# Patient Record
Sex: MALE | Race: WHITE | URBAN - NONMETROPOLITAN AREA
[De-identification: names, ages, dates, MRNs, and addresses within clinical notes are randomized per-mention and may not be internally consistent; named-entity substitution may affect disease eponyms.]

---

## 2017-03-28 ENCOUNTER — TRANSFERRED RECORDS (OUTPATIENT)
Dept: HEALTH INFORMATION MANAGEMENT | Facility: HOSPITAL | Age: 67
End: 2017-03-28

## 2017-10-11 RX ORDER — MULTIVIT-MIN/IRON/FOLIC/HRB186 3.3 MG-25
1 TABLET ORAL DAILY
COMMUNITY
End: 2020-07-21

## 2017-10-11 RX ORDER — CHOLECALCIFEROL (VITAMIN D3) 25 MCG
1 CAPSULE ORAL DAILY
COMMUNITY
End: 2020-07-21

## 2017-10-11 RX ORDER — MESALAMINE 400 MG/1
400 CAPSULE, DELAYED RELEASE ORAL 3 TIMES DAILY
COMMUNITY
End: 2017-10-11

## 2017-10-11 RX ORDER — MULTIPLE VITAMINS W/ MINERALS TAB 9MG-400MCG
1 TAB ORAL DAILY
COMMUNITY
End: 2017-10-11

## 2017-10-17 ENCOUNTER — SURGERY (OUTPATIENT)
Age: 67
End: 2017-10-17

## 2017-10-17 ENCOUNTER — ANESTHESIA EVENT (OUTPATIENT)
Dept: SURGERY | Facility: HOSPITAL | Age: 67
End: 2017-10-17
Payer: MEDICARE

## 2017-10-17 ENCOUNTER — ANESTHESIA (OUTPATIENT)
Dept: SURGERY | Facility: HOSPITAL | Age: 67
End: 2017-10-17
Payer: MEDICARE

## 2017-10-17 ENCOUNTER — HOSPITAL ENCOUNTER (OUTPATIENT)
Facility: HOSPITAL | Age: 67
Discharge: HOME | End: 2017-10-17
Attending: INTERNAL MEDICINE | Admitting: INTERNAL MEDICINE
Payer: MEDICARE

## 2017-10-17 VITALS
TEMPERATURE: 97 F | SYSTOLIC BLOOD PRESSURE: 125 MMHG | OXYGEN SATURATION: 93 % | RESPIRATION RATE: 20 BRPM | DIASTOLIC BLOOD PRESSURE: 96 MMHG

## 2017-10-17 PROCEDURE — 45385 COLONOSCOPY W/LESION REMOVAL: CPT | Mod: PT | Performed by: ANESTHESIOLOGY

## 2017-10-17 PROCEDURE — 27210794 ZZH OR GENERAL SUPPLY STERILE: Performed by: INTERNAL MEDICINE

## 2017-10-17 PROCEDURE — 40000306 ZZH STATISTIC PRE PROC ASSESS II: Performed by: INTERNAL MEDICINE

## 2017-10-17 PROCEDURE — 36000050 ZZH SURGERY LEVEL 2 1ST 30 MIN: Performed by: INTERNAL MEDICINE

## 2017-10-17 PROCEDURE — 71000027 ZZH RECOVERY PHASE 2 EACH 15 MINS: Performed by: INTERNAL MEDICINE

## 2017-10-17 PROCEDURE — 45385 COLONOSCOPY W/LESION REMOVAL: CPT | Mod: PT | Performed by: NURSE ANESTHETIST, CERTIFIED REGISTERED

## 2017-10-17 PROCEDURE — 25000128 H RX IP 250 OP 636: Performed by: NURSE ANESTHETIST, CERTIFIED REGISTERED

## 2017-10-17 PROCEDURE — 88305 TISSUE EXAM BY PATHOLOGIST: CPT | Mod: TC | Performed by: INTERNAL MEDICINE

## 2017-10-17 PROCEDURE — 37000008 ZZH ANESTHESIA TECHNICAL FEE, 1ST 30 MIN: Performed by: INTERNAL MEDICINE

## 2017-10-17 PROCEDURE — 25000128 H RX IP 250 OP 636: Performed by: ANESTHESIOLOGY

## 2017-10-17 RX ORDER — ONDANSETRON 4 MG/1
4 TABLET, ORALLY DISINTEGRATING ORAL EVERY 30 MIN PRN
Status: DISCONTINUED | OUTPATIENT
Start: 2017-10-17 | End: 2017-10-17 | Stop reason: HOSPADM

## 2017-10-17 RX ORDER — SODIUM CHLORIDE, SODIUM LACTATE, POTASSIUM CHLORIDE, CALCIUM CHLORIDE 600; 310; 30; 20 MG/100ML; MG/100ML; MG/100ML; MG/100ML
INJECTION, SOLUTION INTRAVENOUS CONTINUOUS
Status: DISCONTINUED | OUTPATIENT
Start: 2017-10-17 | End: 2017-10-17 | Stop reason: HOSPADM

## 2017-10-17 RX ORDER — ONDANSETRON 2 MG/ML
4 INJECTION INTRAMUSCULAR; INTRAVENOUS
Status: DISCONTINUED | OUTPATIENT
Start: 2017-10-17 | End: 2017-10-17 | Stop reason: HOSPADM

## 2017-10-17 RX ORDER — MEPERIDINE HYDROCHLORIDE 25 MG/ML
12.5 INJECTION INTRAMUSCULAR; INTRAVENOUS; SUBCUTANEOUS
Status: DISCONTINUED | OUTPATIENT
Start: 2017-10-17 | End: 2017-10-17 | Stop reason: HOSPADM

## 2017-10-17 RX ORDER — HYDRALAZINE HYDROCHLORIDE 20 MG/ML
2.5-5 INJECTION INTRAMUSCULAR; INTRAVENOUS EVERY 10 MIN PRN
Status: DISCONTINUED | OUTPATIENT
Start: 2017-10-17 | End: 2017-10-17 | Stop reason: HOSPADM

## 2017-10-17 RX ORDER — ONDANSETRON 2 MG/ML
4 INJECTION INTRAMUSCULAR; INTRAVENOUS EVERY 30 MIN PRN
Status: DISCONTINUED | OUTPATIENT
Start: 2017-10-17 | End: 2017-10-17 | Stop reason: HOSPADM

## 2017-10-17 RX ORDER — DEXAMETHASONE SODIUM PHOSPHATE 4 MG/ML
4 INJECTION, SOLUTION INTRA-ARTICULAR; INTRALESIONAL; INTRAMUSCULAR; INTRAVENOUS; SOFT TISSUE EVERY 10 MIN PRN
Status: DISCONTINUED | OUTPATIENT
Start: 2017-10-17 | End: 2017-10-17 | Stop reason: HOSPADM

## 2017-10-17 RX ORDER — NALOXONE HYDROCHLORIDE 0.4 MG/ML
.1-.4 INJECTION, SOLUTION INTRAMUSCULAR; INTRAVENOUS; SUBCUTANEOUS
Status: DISCONTINUED | OUTPATIENT
Start: 2017-10-17 | End: 2017-10-17 | Stop reason: HOSPADM

## 2017-10-17 RX ORDER — FENTANYL CITRATE 50 UG/ML
25-50 INJECTION, SOLUTION INTRAMUSCULAR; INTRAVENOUS
Status: DISCONTINUED | OUTPATIENT
Start: 2017-10-17 | End: 2017-10-17 | Stop reason: HOSPADM

## 2017-10-17 RX ORDER — FENTANYL CITRATE 50 UG/ML
INJECTION, SOLUTION INTRAMUSCULAR; INTRAVENOUS PRN
Status: DISCONTINUED | OUTPATIENT
Start: 2017-10-17 | End: 2017-10-17

## 2017-10-17 RX ORDER — PROPOFOL 10 MG/ML
INJECTION, EMULSION INTRAVENOUS PRN
Status: DISCONTINUED | OUTPATIENT
Start: 2017-10-17 | End: 2017-10-17

## 2017-10-17 RX ORDER — ALBUTEROL SULFATE 0.83 MG/ML
2.5 SOLUTION RESPIRATORY (INHALATION) EVERY 4 HOURS PRN
Status: DISCONTINUED | OUTPATIENT
Start: 2017-10-17 | End: 2017-10-17 | Stop reason: HOSPADM

## 2017-10-17 RX ORDER — PROMETHAZINE HYDROCHLORIDE 25 MG/ML
12.5 INJECTION, SOLUTION INTRAMUSCULAR; INTRAVENOUS
Status: DISCONTINUED | OUTPATIENT
Start: 2017-10-17 | End: 2017-10-17 | Stop reason: HOSPADM

## 2017-10-17 RX ORDER — LABETALOL HYDROCHLORIDE 5 MG/ML
10 INJECTION, SOLUTION INTRAVENOUS
Status: DISCONTINUED | OUTPATIENT
Start: 2017-10-17 | End: 2017-10-17 | Stop reason: HOSPADM

## 2017-10-17 RX ORDER — LIDOCAINE 40 MG/G
CREAM TOPICAL
Status: DISCONTINUED | OUTPATIENT
Start: 2017-10-17 | End: 2017-10-17 | Stop reason: HOSPADM

## 2017-10-17 RX ADMIN — PROPOFOL 30 MG: 10 INJECTION, EMULSION INTRAVENOUS at 10:30

## 2017-10-17 RX ADMIN — PROPOFOL 10 MG: 10 INJECTION, EMULSION INTRAVENOUS at 10:38

## 2017-10-17 RX ADMIN — FENTANYL CITRATE 50 MCG: 50 INJECTION, SOLUTION INTRAMUSCULAR; INTRAVENOUS at 10:22

## 2017-10-17 RX ADMIN — PROPOFOL 50 MG: 10 INJECTION, EMULSION INTRAVENOUS at 10:28

## 2017-10-17 RX ADMIN — PROPOFOL 10 MG: 10 INJECTION, EMULSION INTRAVENOUS at 10:40

## 2017-10-17 RX ADMIN — SODIUM CHLORIDE, POTASSIUM CHLORIDE, SODIUM LACTATE AND CALCIUM CHLORIDE: 600; 310; 30; 20 INJECTION, SOLUTION INTRAVENOUS at 09:36

## 2017-10-17 RX ADMIN — FENTANYL CITRATE 50 MCG: 50 INJECTION, SOLUTION INTRAMUSCULAR; INTRAVENOUS at 10:27

## 2017-10-17 RX ADMIN — PROPOFOL 20 MG: 10 INJECTION, EMULSION INTRAVENOUS at 10:32

## 2017-10-17 RX ADMIN — PROPOFOL 20 MG: 10 INJECTION, EMULSION INTRAVENOUS at 10:36

## 2017-10-17 RX ADMIN — MIDAZOLAM HYDROCHLORIDE 1 MG: 1 INJECTION, SOLUTION INTRAMUSCULAR; INTRAVENOUS at 10:22

## 2017-10-17 RX ADMIN — MIDAZOLAM HYDROCHLORIDE 1 MG: 1 INJECTION, SOLUTION INTRAMUSCULAR; INTRAVENOUS at 10:27

## 2017-10-17 RX ADMIN — PROPOFOL 20 MG: 10 INJECTION, EMULSION INTRAVENOUS at 10:34

## 2017-10-17 ASSESSMENT — LIFESTYLE VARIABLES: TOBACCO_USE: 1

## 2017-10-17 NOTE — OR NURSING
Patient and responsible adult given discharge instructions with no questions regarding instructions. Lele score 20. Pain level 0/10.  Discharged from unit via ambulatory. Patient discharged to home.

## 2017-10-17 NOTE — IP AVS SNAPSHOT
MRN:[id number]                      After Visit Summary   10/17/2017    Nicholas Gomez    MRN: [id number]           Thank you!     Thank you for choosing [facility] for your care. Our goal is always to provide you with excellent care. Hearing back from our patients is one way we can continue to improve our services. Please take a few minutes to complete the written survey that you may receive in the mail after you visit with us. Thank you!        Patient Information     Date Of Birth          1950        About your hospital stay     You were admitted on:  October 17, 2017 You last received care in the:  [facility] Preop/Phase II    You were discharged on:  October 17, 2017       Who to Call     For medical emergencies, please call 911.  For non-urgent questions about your medical care, please call your primary care provider or clinic, [phone]  For questions related to your surgery, please call your surgery clinic        Attending Provider     Provider Specialty    Benji Simpson MD Gastroenterology       Primary Care Provider Office Phone # Fax #    Marshal Vargas [phone]      Further instructions from your care team           Post-Anesthesia Patient Instructions    IMMEDIATELY FOLLOWING SURGERY:  Do not drive or operate machinery for the first twenty four hours after surgery.  Do not make any important decisions for twenty four hours after surgery or while taking narcotic pain medications or sedatives.  If you develop intractable nausea and vomiting or a severe headache please notify your doctor immediately.    FOLLOW-UP:  Please make an appointment with your surgeon as instructed. You do not need to follow up with anesthesia unless specifically instructed to do so.    WOUND CARE INSTRUCTIONS (if applicable):  Keep a dry clean dressing on the anesthesia/puncture wound site if there is drainage.  Once the wound has quit draining you may leave it open to air.  Generally  you should leave the bandage intact for twenty four hours unless there is drainage.  If the epidural site drains for more than 36-48 hours please call the anesthesia department.    QUESTIONS?:  Please feel free to call your physician or the hospital  if you have any questions, and they will be happy to assist you.             INSTRUCTIONS AFTER COLONOSCOPY    WHEN YOU ARE BACK HOME:    Plan to rest for an hour or two after you get home.    You may have some cramping or pressure until you pass gas.    You may resume your regular medications.    Eat a small, light meal at first, and then gradually return to normal meal sizes.  If you had a polyp removed:    Slight bleeding may occur.  You may have a slight blood stain on the toilet paper after a bowel movement.    To lessen the chance of bleeding, avoid heavy exercise for ONE WEEK.  This includes heavy lifting, vigorous sport activities, and heavy physical labor.  You may resume your normal sexual activity.      Avoid aspirin or aspirin products if instructed by your doctor.    WHAT TO WATCH FOR:  Problems rarely occur after the exam; however, it is important for you to watch for early signs of possible problems.  If you have     Unusual pain in your abdomen    Nausea and vomiting that persists    Excessive bleeding    Black or bloody bowel movements    Fever or temperature above 100.6 F  Please call your doctor ([facility] [phone]) or go to the nearest hospital emergency room.    Pending Results     No orders found from 10/15/2017 to 10/18/2017.            Admission Information     Date & Time Provider Department Dept. Phone    10/17/2017 Benji Simpson MD HI Preop/Phase [phone]      Your Vitals Were     Blood Pressure Temperature Respirations Pulse Oximetry          125/96 97.8  F (36.6  C) (Oral) 20 96%        MyChart Information     MyChart lets you send messages to your doctor, view your test results, renew your prescriptions, schedule  "appointments and more. To sign up, go to [email or web address] . Click on \"Log in\" on the left side of the screen, which will take you to the Welcome page. Then click on \"Sign up Now\" on the right side of the page.     You will be asked to enter the access code listed below, as well as some personal information. Please follow the directions to create your username and password.     Your access code is: [email or web address]  Expires: 1/15/2018 11:35 AM     Your access code will  in 90 days. If you need help or a new code, please call your [facility] clinic or [phone].        Care EveryWhere ID     This is your Care EveryWhere ID. This could be used by other organizations to access your [facility] medical records  [id number]        Equal Access to Services     POORNIMA LANG : Gerri Lester, rita juarez, yonny zaragoza, leah bustillo . So [facility] [phone].    ATENCIÓN: Si habla español, tiene a warner disposición servicios gratuitos de asistencia lingüística. Llame al [phone].    We comply with applicable federal civil rights laws and [place] laws. We do not discriminate on the basis of race, color, national origin, age, disability, sex, sexual orientation, or gender identity.               Review of your medicines      CONTINUE these medicines which have NOT CHANGED        Dose / Directions    ALLOPURINOL PO        Dose:  100 mg   Take 100 mg by mouth daily   Refills:  0       ASPIRIN PO        Dose:  81 mg   Take 81 mg by mouth daily   Refills:  0       Co Q 10 100 MG Caps        Dose:  1 capsule   Take 1 capsule by mouth daily   Refills:  0       Glucosamine-Chondroitin 250-200 MG Caps        Dose:  1 capsule   Take 1 capsule by mouth daily   Refills:  0       GNP FLAX SEED OIL 1000 MG Caps        Dose:  1 capsule   Take 1 capsule by mouth daily   Refills:  0       INDOMETHACIN PO        Dose:  25 mg   Take 25 mg by mouth 3 times daily (with meals) "   Refills:  0       LISINOPRIL PO        Dose:  20 mg   Take 20 mg by mouth daily   Refills:  0       Vitamin D-3 1000 UNITS Caps        Dose:  1 capsule   Take 1 capsule by mouth daily   Refills:  0                Protect others around you: Learn how to safely use, store and throw away your medicines at www.disposemymeds.org.             Medication List: This is a list of all your medications and when to take them. Check marks below indicate your daily home schedule. Keep this list as a reference.      Medications           Morning Afternoon Evening Bedtime As Needed    ALLOPURINOL PO   Take 100 mg by mouth daily                                ASPIRIN PO   Take 81 mg by mouth daily                                Co Q 10 100 MG Caps   Take 1 capsule by mouth daily                                Glucosamine-Chondroitin 250-200 MG Caps   Take 1 capsule by mouth daily                                GNP FLAX SEED OIL 1000 MG Caps   Take 1 capsule by mouth daily                                INDOMETHACIN PO   Take 25 mg by mouth 3 times daily (with meals)                                LISINOPRIL PO   Take 20 mg by mouth daily                                Vitamin D-3 1000 UNITS Caps   Take 1 capsule by mouth daily

## 2017-10-17 NOTE — DISCHARGE INSTRUCTIONS
Post-Anesthesia Patient Instructions    IMMEDIATELY FOLLOWING SURGERY:  Do not drive or operate machinery for the first twenty four hours after surgery.  Do not make any important decisions for twenty four hours after surgery or while taking narcotic pain medications or sedatives.  If you develop intractable nausea and vomiting or a severe headache please notify your doctor immediately.    FOLLOW-UP:  Please make an appointment with your surgeon as instructed. You do not need to follow up with anesthesia unless specifically instructed to do so.    WOUND CARE INSTRUCTIONS (if applicable):  Keep a dry clean dressing on the anesthesia/puncture wound site if there is drainage.  Once the wound has quit draining you may leave it open to air.  Generally you should leave the bandage intact for twenty four hours unless there is drainage.  If the epidural site drains for more than 36-48 hours please call the anesthesia department.    QUESTIONS?:  Please feel free to call your physician or the hospital  if you have any questions, and they will be happy to assist you.             INSTRUCTIONS AFTER COLONOSCOPY    WHEN YOU ARE BACK HOME:    Plan to rest for an hour or two after you get home.    You may have some cramping or pressure until you pass gas.    You may resume your regular medications.    Eat a small, light meal at first, and then gradually return to normal meal sizes.  If you had a polyp removed:    Slight bleeding may occur.  You may have a slight blood stain on the toilet paper after a bowel movement.    To lessen the chance of bleeding, avoid heavy exercise for ONE WEEK.  This includes heavy lifting, vigorous sport activities, and heavy physical labor.  You may resume your normal sexual activity.      Avoid aspirin or aspirin products if instructed by your doctor.    WHAT TO WATCH FOR:  Problems rarely occur after the exam; however, it is important for you to watch for early signs of possible  problems.  If you have     Unusual pain in your abdomen    Nausea and vomiting that persists    Excessive bleeding    Black or bloody bowel movements    Fever or temperature above 100.6 F  Please call your doctor ([facility] [phone]) or go to the nearest hospital emergency room.

## 2017-10-17 NOTE — OP NOTE
Nicholas Gomez MRN# [id number]   YOB: 1950 Age: 66 year old      Date of Admission:  10/17/2017  Date of Service:   10/17/17    Primary care provider: Marshal Vargas    PREOPERATIVE DIAGNOSIS:  Screening       POSTOPERATIVE DIAGNOSIS:  Polyps  x3  diverticulosis         PROCEDURE:  Colonoscopy         INDICATIONS:  Screening colonoscopy.       SURGEON: Benji Simpson MD    DESCRIPTION OF PROCEDURE: Nicholas Gomez was brought into the endoscopy suite and placed in the left lateral decubitus position. After preprocedural pause and attended monitored anesthesia was administered, the external anus was inspected and was normal. Digital rectal exam was normal. The colonoscope was inserted and advanced under direct visualization to the level of the cecum which was identified by the appendiceal orifice and the ileocecal valve. The prep was fair. In the cecum I removed a 8-10 mm sessile polyp with cold snare.  In the descending colon I removed a 5 mm polyp with cold snare, in the rectum I removed a 3 mm polyp with cold forceps.  There was diverticulosis in the sigmoid colon.   The rest of the colon was without mucosal abnormality. There was no evidence of further polyps, diverticula, inflammation, bleeding or AVMs. Retroflexion of the rectum was normal. The extra air was removed from the colon, and the colonoscope withdrawn. The patient tolerated the procedure well and was taken to postanesthesia care unit.     We invite the patient to return in 3 years for follow up colonoscopy due to polyps and fair prep.     Benji Simpson MD

## 2017-10-17 NOTE — ANESTHESIA CARE TRANSFER NOTE
Patient: Nicholas Gomez    Procedure(s):  COLONOSCOPY WITH POLYPECTOMY - Wound Class: II-Clean Contaminated    Diagnosis: FAMILY HX OF COLON CANCER  Diagnosis Additional Information: No value filed.    Anesthesia Type:   MAC     Note:  Airway :Nasal Cannula  Patient transferred to:Phase II  Handoff Report: Identifed the Patient, Identified the Reponsible Provider, Reviewed the pertinent medical history, Discussed the surgical course, Reviewed Intra-OP anesthesia mangement and issues during anesthesia, Set expectations for post-procedure period and Allowed opportunity for questions and acknowledgement of understanding      Vitals: (Last set prior to Anesthesia Care Transfer)    CRNA VITALS  10/17/2017 1018 - 10/17/2017 1053      10/17/2017             Resp Rate (observed): (!)  2    Resp Rate (set): 8                Electronically Signed By: TWYLA Franklin CRNA  October 17, 2017  10:53 AM

## 2017-10-17 NOTE — CONSULTS
Pre-Endoscopy History and Physical     Nicholas Gomez MRN# [id number]   YOB: 1950 Age: 66 year old     Date of Procedure: 10/17/2017  Primary care provider: Marshal Vargas  Type of Endoscopy: Colonoscopy with possible biopsy, possible polypectomy  Reason for Procedure: Family history of colon cancer  Type of Anesthesia Anticipated: MAC    HPI:    Nicholas is a 66 year old male who will be undergoing the above procedure.      A history and physical has been performed. The patient's medications and allergies have been reviewed. The risks and benefits of the procedure and the sedation options and risks were discussed with the patient.  All questions were answered and informed consent was obtained.      He denies a personal or family history of anesthesia complications or bleeding disorders.     There is no problem list on file for this patient.       Past Medical History:   Diagnosis Date     Coronary artery disease      Heart attack      Hypertension         Past Surgical History:   Procedure Laterality Date     CARDIAC SURGERY       COLONOSCOPY       COLONOSCOPY - HIM SCAN  02/01/2007    Colonoscopy - due in 5 years 2/1/2017     EYE SURGERY       ORTHOPEDIC SURGERY         Social History   Substance Use Topics     Smoking status: Never Smoker     Smokeless tobacco: Never Used     Alcohol use Yes       No family history on file.    Prior to Admission medications    Medication Sig Start Date End Date Taking? Authorizing Provider   ASPIRIN PO Take 81 mg by mouth daily   Yes Reported, Patient   Glucosamine-Chondroitin 250-200 MG CAPS Take 1 capsule by mouth daily   Yes Reported, Patient   Flaxseed, Linseed, (GNP FLAX SEED OIL) 1000 MG CAPS Take 1 capsule by mouth daily   Yes Reported, Patient   Coenzyme Q10 (CO Q 10) 100 MG CAPS Take 1 capsule by mouth daily   Yes Reported, Patient   Cholecalciferol (VITAMIN D-3) 1000 UNITS CAPS Take 1 capsule by mouth daily   Yes Reported, Patient   INDOMETHACIN PO Take 25  mg by mouth 3 times daily (with meals)   Yes Reported, Patient   ALLOPURINOL PO Take 100 mg by mouth daily   Yes Reported, Patient   LISINOPRIL PO Take 20 mg by mouth daily   Yes Reported, Patient       No Known Allergies     REVIEW OF SYSTEMS:   5 point ROS negative except as noted above in HPI, including Gen., Resp., CV, GI &  system review.    PHYSICAL EXAM:   BP (!) 157/104  Temp 97.8  F (36.6  C) (Oral)  Resp 18  SpO2 97% There is no height or weight on file to calculate BMI.   GENERAL APPEARANCE: alert, and oriented  MENTAL STATUS: alert  AIRWAY EXAM: Mallampatti Class II (visualization of the soft palate, fauces, and uvula)  RESP: lungs clear to auscultation - no rales, rhonchi or wheezes  CV: regular rates and rhythm  DIAGNOSTICS:    Not indicated    IMPRESSION   ASA Class 2 - Mild systemic disease    PLAN:   Plan for Colonoscopy with possible biopsy, possible polypectomy. We discussed the risks, benefits and alternatives and the patient wished to proceed.    The above has been forwarded to the consulting provider.      Signed Electronically by: Benji Simpson MD  October 17, 2017

## 2017-10-17 NOTE — ANESTHESIA PREPROCEDURE EVALUATION
Anesthesia Evaluation     . Pt has had prior anesthetic.     No history of anesthetic complications          ROS/MED HX    ENT/Pulmonary:     (+)JENNIFER risk factors (BMI: 37.92) hypertension, obese, tobacco use, Past use , . .    Neurologic:     (+)neuropathy     Cardiovascular:     (+) Dyslipidemia, hypertension-range: not on beta blocker, -CAD, -past MI (1996),-. : . . . :. .       METS/Exercise Tolerance:     Hematologic:  - neg hematologic  ROS       Musculoskeletal:   (+) arthritis, , , other musculoskeletal- Gout      GI/Hepatic:     (+) bowel prep,       Renal/Genitourinary:  - ROS Renal section negative       Endo:     (+) Obesity, .      Psychiatric:  - neg psychiatric ROS       Infectious Disease:  - neg infectious disease ROS       Malignancy:      - no malignancy   Other:    - neg other ROS                 Physical Exam      Airway   TM distance: <3 FB  Neck ROM: full    Dental   (+) upper dentures, lower dentures, partials and missing    Cardiovascular   Rhythm and rate: regular and normal      Pulmonary    breath sounds clear to auscultation(+) decreased breath sounds                       Anesthesia Plan      History & Physical Review      ASA Status:  3 .        Plan for MAC with Intravenous and Propofol induction. Maintenance will be TIVA.  Reason for MAC:  Chronic cardiopulmonary disease (G9) and Other - see comments  PONV prophylaxis:  Ondansetron (or other 5HT-3)  Surgeon requests deep sedation. Patient has a Mallampati 3 airway. Will provide MAC.      Postoperative Care  Postoperative pain management:  IV analgesics.      Consents  Anesthetic plan, risks, benefits and alternatives discussed with:  Patient..                          .

## 2017-10-17 NOTE — IP AVS SNAPSHOT
HI Preop/Phase II    [address] [address]    Phone:  [phone]                                       After Visit Summary   10/17/2017    Nicholas Gomez    MRN: [id number]           After Visit Summary Signature Page     I have received my discharge instructions, and my questions have been answered. I have discussed any challenges I see with this plan with the nurse or doctor.    ..........................................................................................................................................  Patient/Patient Representative Signature      ..........................................................................................................................................  Patient Representative Print Name and Relationship to Patient    ..................................................               ................................................  Date                                            Time    ..........................................................................................................................................  Reviewed by Signature/Title    ...................................................              ..............................................  Date                                                            Time

## 2017-10-18 LAB — COPATH REPORT: NORMAL

## 2017-10-18 NOTE — ANESTHESIA POSTPROCEDURE EVALUATION
Patient: Nicholas Gomez    Procedure(s):  COLONOSCOPY WITH POLYPECTOMY - Wound Class: II-Clean Contaminated    Diagnosis:FAMILY HX OF COLON CANCER  Diagnosis Additional Information: No value filed.    Anesthesia Type:  MAC    Note:  Anesthesia Post Evaluation    Patient location during evaluation: Phase 2 and Bedside  Patient participation: Able to fully participate in evaluation  Level of consciousness: awake and alert  Pain management: adequate  Airway patency: patent  Cardiovascular status: acceptable  Respiratory status: acceptable  Hydration status: stable  PONV: none     Anesthetic complications: None          Last vitals:  Vitals:    10/17/17 1120 10/17/17 1125 10/17/17 1130   BP: 116/90 125/96    Resp: 20 20    Temp:   97  F (36.1  C)   SpO2: 95% 96% 93%         Electronically Signed By: Marky Burris MD  October 18, 2017  10:53 AM

## 2020-05-01 ENCOUNTER — TRANSFERRED RECORDS (OUTPATIENT)
Dept: HEALTH INFORMATION MANAGEMENT | Facility: CLINIC | Age: 70
End: 2020-05-01

## 2020-07-17 ENCOUNTER — TELEPHONE (OUTPATIENT)
Dept: SURGERY | Facility: OTHER | Age: 70
End: 2020-07-17

## 2020-07-17 NOTE — TELEPHONE ENCOUNTER
Referral received for colonoscopy.   This patient was approved by surgery education nurses for meet and greet colonoscopy and will need a preop or consult appointment.   I attempted to call patient to schedule. No answer. I called the pt and left a message to call back.      Yessenia Grover LPN

## 2020-07-21 ENCOUNTER — TELEPHONE (OUTPATIENT)
Dept: SURGERY | Facility: OTHER | Age: 70
End: 2020-07-21

## 2020-07-21 ENCOUNTER — PREP FOR PROCEDURE (OUTPATIENT)
Dept: SURGERY | Facility: OTHER | Age: 70
End: 2020-07-21

## 2020-07-21 DIAGNOSIS — I21.9 HEART ATTACK (H): ICD-10-CM

## 2020-07-21 DIAGNOSIS — Z12.11 SPECIAL SCREENING FOR MALIGNANT NEOPLASMS, COLON: Primary | ICD-10-CM

## 2020-07-21 DIAGNOSIS — E11.40 DIABETIC NEUROPATHY (H): ICD-10-CM

## 2020-07-21 DIAGNOSIS — Z87.39 HISTORY OF GOUT: ICD-10-CM

## 2020-07-21 DIAGNOSIS — Z80.42 FAMILY HISTORY OF PROSTATE CANCER: ICD-10-CM

## 2020-07-21 DIAGNOSIS — E11.9 DIABETES MELLITUS, TYPE 2 (H): ICD-10-CM

## 2020-07-21 DIAGNOSIS — I25.10 CORONARY ARTERY DISEASE INVOLVING NATIVE CORONARY ARTERY OF NATIVE HEART: ICD-10-CM

## 2020-07-21 DIAGNOSIS — Z80.0 FAMILY HISTORY OF COLON CANCER: ICD-10-CM

## 2020-07-21 DIAGNOSIS — Z12.11 ENCOUNTER FOR SCREENING COLONOSCOPY: Primary | ICD-10-CM

## 2020-07-21 DIAGNOSIS — Z86.0100 HISTORY OF COLONIC POLYPS: ICD-10-CM

## 2020-07-21 DIAGNOSIS — I10 ESSENTIAL HYPERTENSION: ICD-10-CM

## 2020-07-21 RX ORDER — LANCETS
EACH MISCELLANEOUS PRN
COMMUNITY

## 2020-07-21 RX ORDER — ROSUVASTATIN CALCIUM 20 MG/1
20 TABLET, COATED ORAL DAILY
COMMUNITY

## 2020-07-21 RX ORDER — DAPAGLIFLOZIN 10 MG/1
10 TABLET, FILM COATED ORAL DAILY
COMMUNITY

## 2020-07-21 RX ORDER — AMLODIPINE BESYLATE 5 MG/1
5 TABLET ORAL DAILY
COMMUNITY

## 2020-07-21 NOTE — LETTER
July 21, 2020          Nicholas Gomez  [address] [place] [id number]        Dear Nicholas,         We want to your Colonoscopy to be as pleasant as possible. Please review the instructions below. If you have questions, you may contact us at the any of the following numbers:     [facility] Health Unit Coordinator: [phone]  Clinic Nurse (Rachel): [phone]  Surgery Education Nurse: [phone]    Date of procedure: 10/22/2020 with Dr. Tsang  Admit Time: Hospital Surgery will call you the day before your procedure by 5pm with your arrival time. If your surgery is on Monday, expect a call on Friday.  If you are not contacted before 5PM, please call admitting at [phone].   After hours or on weekends, please call [phone] to postpone.   Call the clinic nurse if you become ill within 1 week of your procedure to reschedule.     Preop appointment needed within the 30 days prior to your procedure--please schedule with Dr. Vargas or one of his partners.    COVID-19 test is needed prior to 4 days in the morning prior to procedure. This is done as drive-up curbside testing in the white tent on the West side of the [facility] parking lot in your vehicle. Follow the signage and bring your mobile phone if you have one to call the phone number on the sign outside the tent for check-in. If you do not have a cell phone, please call the nurse for instructions on checking in. This needs to be scheduled on 10/18/2020.     7 DAYS BEFORE THE EXAM:  Call the Surgery Education Nurse at [phone] and have a medication list ready.   Stop Aspirin or NSAIDS (Ibuprofen, Celebrex, Naproxen, etc) 7 days before surgery.  Stop fiber supplements, herbals, vitamins, and iron. Stop eating corn, nuts and seeds.  If you are prescribed blood thinners or insulin, talk to your primary provider for instructions.  Please  the following over the counter items for your bowel prep:    Two 5 mg Dulcolax (bisacodyl) tablets   One 8.3 ounce  "(238 g) bottle of miralax   One 10 ounce bottle of magnesium citrate   One 64 ounce bottle of gatorade-Not red, purple, or powdered.      2 DAYS BEFORE THE EXAM:   Low fiber diet.   See list of low fiber foods on page 3 of the \"Miralax, Dulcolax and Magnesium Citrate\" packet.   Drink at least 4-6 large glasses of sports drink today and tomorrow. Avoid red and purple.      1 DAY BEFORE THE EXAM:  No solid food/milk products after 12:01 AM. Drink only clear liquids all day, at least 8-10 glasses.   Please see list of clear liquids on page 2 of \"Miralax, Dulcolax and Magnesium Citrate\" packet.    Avoid anything red or purple. No alcohol.            AT 12:00 PM NOON THE DAY BEFORE EXAM:  Take 2 Dulcolax tablets by mouth with clear liquids.            AT 6:00 PM THE DAY BEFORE EXAM:  Mix the bottle of Miralax and 64 oz. of Gatorade in a pitcher.   Drink one 8 oz. glass every 10-15 minutes until gone. Stay near a toilet.       DAY OF COLONOSCOPY:             6 HOURS PRIOR TO EXAM ON DAY OF PROCEDURE:  Drink the bottle of magnesium citrate followed by a full glass of water.   You may have clear liquids up until 2 hours before arrival.  If you need to take any medications after this, take them with a tiny sip of water.   Shower before arrival and wear clean, comfortable clothes.   No jewelry, make-up, nail polish, hair spray, lotions, or perfumes.   Prince in Admitting through the [facility].   You must have a responsible adult to drive and to stay with you for 4 hours at home.       TIPS FOR COLON CLEANSING BEFORE YOUR COLONOSCOPY  To get accurate results from your exam, your colon must be completely empty or you may need to repeat the colon prep and exam. If you followed instructions and your stool is clear or yellow liquid, you are ready. If you are not sure if your colon is clean, please call the clinic nurse.    You may use Tucks wipes, hemorrhoid treatments, hydrocortisone cream or alcohol-free baby wipes to " ease anal irritation. You may also use Vaseline to help protect the skin.     Quickly drink each glass. Even when you are sitting on the toilet, keep drinking every 15 minutes. If you have nausea or vomiting, take a break for 30 minutes and then resume drinking.    You will have loose watery stools and may also have chills. Dress for comfort. Expect to feel bloating, nausea and other discomfort until the stool clears from your colon.           Sincerely,        Christos Tsang MD/Kiley MALDONADO LPN

## 2020-07-21 NOTE — TELEPHONE ENCOUNTER
Last colonoscopy 10/17/17-due for 3 year follow-up after 10/17/2020.    Referral received for colonoscopy.   This patient was not approved by surgery education nurses for meet and greet colonoscopy and will not need a preop or consult appointment.   Patient scheduled for colonoscopy on 10/22/2020 with Dr. Tsang at [facility] with Gatorade bowel prep.   Instructions given via phone and instructions mailed to patient with surgery handbook.   Instructed patient that he will need a COVID-19 test 4 days prior to procedure and preop within 30 days prior to procedure. Patient will wait until gets closer to schedule.

## 2020-09-08 ENCOUNTER — TELEPHONE (OUTPATIENT)
Dept: SURGERY | Facility: OTHER | Age: 70
End: 2020-09-08

## 2020-09-08 NOTE — TELEPHONE ENCOUNTER
Patient called in and stated he would like to cancel his colonoscopy scheduled for 10/22/20 with Dr. Tsang - patient stated you can call him if needed

## 2020-09-08 NOTE — TELEPHONE ENCOUNTER
Patient called and cancelled his upcoming colonoscopy with DR Tsang on 10/22/20.  Surgery has been notified of the cancellation.  ENIO MARC LPN

## (undated) DEVICE — CANISTER-SUCTION 2000CC

## (undated) DEVICE — TUBING-SUCTION 20FT

## (undated) DEVICE — TRAP-POLYP E-TRAP

## (undated) DEVICE — IRRIGATION-H2O 1000ML

## (undated) DEVICE — LUBRICANT JELLY 2OZ. TUBE

## (undated) DEVICE — SNARE-ROTATABLE 20MM MINI OVAL

## (undated) RX ORDER — PROPOFOL 10 MG/ML
INJECTION, EMULSION INTRAVENOUS
Status: DISPENSED
Start: 2017-10-17

## (undated) RX ORDER — FENTANYL CITRATE 50 UG/ML
INJECTION, SOLUTION INTRAMUSCULAR; INTRAVENOUS
Status: DISPENSED
Start: 2017-10-17